# Patient Record
Sex: FEMALE | NOT HISPANIC OR LATINO | Employment: UNEMPLOYED | URBAN - METROPOLITAN AREA
[De-identification: names, ages, dates, MRNs, and addresses within clinical notes are randomized per-mention and may not be internally consistent; named-entity substitution may affect disease eponyms.]

---

## 2023-12-08 ENCOUNTER — APPOINTMENT (OUTPATIENT)
Dept: PHYSICAL THERAPY | Facility: CLINIC | Age: 43
End: 2023-12-08
Payer: COMMERCIAL

## 2023-12-13 ENCOUNTER — APPOINTMENT (OUTPATIENT)
Dept: PHYSICAL THERAPY | Facility: CLINIC | Age: 43
End: 2023-12-13
Payer: COMMERCIAL

## 2023-12-22 ENCOUNTER — EVALUATION (OUTPATIENT)
Dept: PHYSICAL THERAPY | Facility: CLINIC | Age: 43
End: 2023-12-22
Payer: COMMERCIAL

## 2023-12-22 DIAGNOSIS — M26.623 BILATERAL TEMPOROMANDIBULAR JOINT PAIN: Primary | ICD-10-CM

## 2023-12-22 PROCEDURE — 97535 SELF CARE MNGMENT TRAINING: CPT | Mod: GP

## 2023-12-22 PROCEDURE — 97162 PT EVAL MOD COMPLEX 30 MIN: CPT | Mod: GP

## 2023-12-22 ASSESSMENT — PAIN - FUNCTIONAL ASSESSMENT: PAIN_FUNCTIONAL_ASSESSMENT: 0-10

## 2023-12-22 ASSESSMENT — PAIN SCALES - GENERAL: PAINLEVEL_OUTOF10: 8

## 2023-12-22 NOTE — PROGRESS NOTES
Physical Therapy    Physical Therapy Evaluation and Treatment      Patient Name: Sabrina Quiñonez  MRN: [id number]  Today's Date: 12/22/2023  Time Calculation  Start Time: 1202  Stop Time: 1301  Time Calculation (min): 59 min    Assessment:    Pt presenting to the clinic with TMJ pain and jaw locking with complicated medical history which includes autoimmune disorders, hysterectomy and hormone RPT. Pt has had increasing pain over the past few years with intermittent episodes of jaw locking requiring ER visits. She is unable to take oral pain medicine due to gastroparesis. The pain in her jaw is impacting her ability to talk, eat, socialize with friends. She could benefit from continued PT to address these deficits. However, given patient's background and history discussed with patient maintenance vs. restoration of symptoms.    Plan:  OP PT Plan  PT Plan: Skilled PT  PT Frequency: 1 time per week  Duration: 12 weeks  Rehab Potential: Fair  Plan of Care Agreement: Patient    Planned interventions include: biofeedback, cryotherapy, education/instruction, electrical stimulation, gait training, home program, hot pack, kinesiotaping, manual therapy, neuromuscular re-education, self care/home management, therapeutic activities, and therapeutic exercises.     Current Problem:   1. Bilateral temporomandibular joint pain            Subjective    General:  General  Reason for Referral: TMD  Referred By: Pool  Pt with 3 year history of lock jaw. [facility] - he prescribed tizanidine which she couldn't take due to nausea. She is unable to take due to gastroparesis. She is looking for a primary care doctor to manage symptoms. Pain is behind her eyes, into her cheekbone, and head. She has tooth and ear pain. She has history of dizziness. She is describing far away hearing. History of low blood pressure. Hx of slurred speech or double vision - endocrinologist, neurologist, urologist aware.  Pain is  prevalent in B TMJ but more persistent on the left. Has TMJ clicking, pain with chewing, talking, yawning. Unsure but doesn't think she grind. Does not use heat or ice.   MRIs completed, saw ENT.  She has had 2 episodes of emergency room lock jaw.   Seeing a counselor, using soft foods primarily. Limited by gastroparesis.   Uses a hospital bed, wheel chair. Will intermittently use a rollator.     Impairments:   Looking at a screen for 15 minutes   Talking - cannot connect to her friends     Precautions:  Precautions  Precautions Comment: none     Pain:  Pain Assessment  Pain Assessment: 0-10  Pain Score: 8       Objective   Pt easily distractible and with increased focus on tangential topics minimally associated with conversation. Pt with poor eye contact and volume indicating diminutive tone. Pt understandably emotional during treatment session due to medical management and previous physical health. Not all measurements were taken due to extensive interview.     AROM:  Limited cervical mobility, fear avoidance. Limited in all planes, worse in sagittal plane  Jaw depression: limited to 50% available motion, nervous about extreme rom.   Pt with R mandibular deflection.   Decreased control with elevation with visible popping    PROM/Joint Mobility:  Did not assess intraorally - will attempt at next visit     Palpation:  Tenderness along TM joints L > R  L > R masseter muscle belly   Pain over L mandible proximally     Observation:   Significant tongue scallopping, more anteriorly than posteriorly   Ambulate with wheeled walker     Outcome Measures:  Other Measures  Other Outcome Measures: TMD disability index 22 (pt only filled out 1/2 of outcome measure)       EDUCATION:   Discussed neuromodulation and downtraining AT LENGTH. Pt to focus on dim lighting, breathing, and restorative positions over the next week. Discussed tongue on roof of mouth, diet changes    Goals:    Pt will be independent in HEP to maximize  POC  Pt will be compliant with neural downtraining techniques  Pt will be able to open mouth to 35 mm without locking to allow for pain free eating   Pt will be able to yawn without evidence of locking

## 2023-12-27 ENCOUNTER — APPOINTMENT (OUTPATIENT)
Dept: PHYSICAL THERAPY | Facility: CLINIC | Age: 43
End: 2023-12-27
Payer: COMMERCIAL

## 2024-01-05 ENCOUNTER — APPOINTMENT (OUTPATIENT)
Dept: PHYSICAL THERAPY | Facility: CLINIC | Age: 44
End: 2024-01-05
Payer: COMMERCIAL

## 2024-01-05 ENCOUNTER — TREATMENT (OUTPATIENT)
Dept: PHYSICAL THERAPY | Facility: CLINIC | Age: 44
End: 2024-01-05
Payer: COMMERCIAL

## 2024-01-05 DIAGNOSIS — M26.623 BILATERAL TEMPOROMANDIBULAR JOINT PAIN: ICD-10-CM

## 2024-01-05 PROCEDURE — 97535 SELF CARE MNGMENT TRAINING: CPT | Mod: GP

## 2024-01-05 NOTE — PROGRESS NOTES
PHYSICAL THERAPY   TREATMENT NOTE    Patient Name:  Sabrina Quiñonez   Patient MRN: [id number]  Date: 01/05/24    Time Calculation  Start Time: 1202  Stop Time: 1300  Time Calculation (min): 58 min    Insurance:  Insurance Type: [employer]  Visit number: 2  Approved # of visits ?  Authorization Needed: no  Cert Date Ends On: n/a    General   Reason for visit: TMD  Referred by: Dr. Lanza    Therapy diagnoses:   1. Bilateral temporomandibular joint pain  Referral to Physical Therapy           Assessment:    Continue to recommend neurological downtraining. Pt very verbose this treatment date and requires consistent redirection to task.      Plan:  Discussed rhythmic flow yoga/seated chair yoga  Discussed ice rolling, facial massage   Will do guided imagery, add in tongue on roof of mouth opening     Subjective  L ear pain has gotten worse in the past week. She has been working on breathing. Appointment with primary care this upcoming week. She went to the dentist yesterday.  Pain (0-10): 7/10    Precautions  PMH: multiple autoimmune   Fall Risk: yes    Objective    Treatment Performed:   Therapeutic Exercise:    minutes    Self Care: 58 minutes  Majority of time spent with patient discussing POC, neural re training including guided imagery, repetitive oga flow, MFR, jaw mechanics, self care techniques including diet     Manual Therapy:   minutes    Neuromuscular Re-education:   minutes    Gait Training:    minutes    Modalities: minutes

## 2024-01-12 ENCOUNTER — TELEMEDICINE CLINICAL SUPPORT (OUTPATIENT)
Dept: PHYSICAL THERAPY | Facility: CLINIC | Age: 44
End: 2024-01-12
Payer: COMMERCIAL

## 2024-01-12 DIAGNOSIS — M26.623 BILATERAL TEMPOROMANDIBULAR JOINT PAIN: Primary | ICD-10-CM

## 2024-01-12 PROCEDURE — 97110 THERAPEUTIC EXERCISES: CPT | Mod: GP

## 2024-01-12 NOTE — PROGRESS NOTES
PHYSICAL THERAPY   TREATMENT NOTE    Patient Name:  Sabrina Quiñonez   Patient MRN: [id number]  Date: 01/12/24    Time Calculation  Start Time: 1105  Stop Time: 1149  Time Calculation (min): 44 min    Insurance:  Insurance Type: [employer]  Visit number: 3  Approved # of visits ?  Authorization Needed: no  Cert Date Ends On: n/a    General   Reason for visit: TMD  Referred by: Dr. Lanza    Therapy diagnoses:   1. Bilateral temporomandibular joint pain               Assessment:    Significant improvement in mood this date  Reduction in pain during session     Plan:  Discussed rhythmic flow yoga/seated chair yoga, ice rolling, facial massage   Will do guided imagery, add in tongue on roof of mouth opening   Will continue virtual visits   Recommend focus on breathing     Subjective  *This is a virtual follow up given patient's difficulty with transportation and extreme effort to leave the house. PT is located @ [place] office and patient located at her residence.*   Her left ear is a little bit better than last time   Pain (0-10): 6-7/10 L, R 5/10, jaw pain 7/10 B, ocular pain   Getting a crown 2/8     Precautions  PMH: multiple autoimmune   Fall Risk: yes    Objective  Overjet noted   Bottom mandible shifted to L   Demonstrating continued deviation and incongruent movement with elevation     Treatment Performed:   Therapeutic Exercise:  44 minutes  Review tongue on roof mouth   Cervical side bending   SCM stretch   Seated thoracic extension hands behind head  Self scalp release  Resisted chin abduction   *relaxation music throughout tx*    Self Care:   minutes      Manual Therapy:   minutes    Neuromuscular Re-education:   minutes    Gait Training:    minutes    Modalities: minutes

## 2024-01-19 ENCOUNTER — TELEMEDICINE CLINICAL SUPPORT (OUTPATIENT)
Dept: PHYSICAL THERAPY | Facility: CLINIC | Age: 44
End: 2024-01-19
Payer: COMMERCIAL

## 2024-01-19 DIAGNOSIS — M26.623 BILATERAL TEMPOROMANDIBULAR JOINT PAIN: Primary | ICD-10-CM

## 2024-01-19 PROCEDURE — 97535 SELF CARE MNGMENT TRAINING: CPT | Mod: GP,95

## 2024-01-19 NOTE — PROGRESS NOTES
PHYSICAL THERAPY   TREATMENT NOTE    Patient Name:  Sabrina Quiñonez   Patient MRN: [id number]  Date: 01/19/24    Time Calculation  Start Time: 1245  Stop Time: 1337  Time Calculation (min): 52 min    Insurance:  Insurance Type: [employer]  Visit number: 4  Approved # of visits ?  Authorization Needed: no  Cert Date Ends On: n/a    General   Reason for visit: TMD  Referred by: Dr. Lanza    Therapy diagnoses:   1. Bilateral temporomandibular joint pain  Follow Up In Physical Therapy    Follow Up In Physical Therapy             Assessment:    Pt is exhibiting moderate improvements in symptoms, although pain present in other locations,. She is compliant with all HEP. However, she is limited by lack of mental health care at this time. Long time spent with patient discussing strategies for mental health and redirection of pain.     Plan:  Discussed better help and or bridge with therapy   Discussed stomach/prone lying with neck pillow/towel prop  Discussed ergonomic retraining for postural feedback   Redirecting pain in the morning   Continue grinding avoidance     Will continue virtual visits   Recommend focus on breathing     Subjective  Virtual or Telephone Consent    An interactive audio and video telecommunication system which permits real time communications between the patient (at the originating site) and provider (at the distant site) was utilized to provide this telehealth service.   Verbal consent was requested and obtained from Sabrina Quiñonez on this date, 01/19/24 for a telehealth visit.    Feels like there is no longevity of relief. She has had her mental therapy blocked due to insurance. Their office said it could be 30-90 before initiating it. Eating 1 small meal per day with less pain but still present. Does not have anything scheduled with Dr. Lanza yet (waiting until 2/9). Her main complaint is that she is having increased gastrointestinal pain because she is volitionally trying to reduce  grinding. Overall, TMD symptoms are lesser.   Pain (0-10): 6-7/10 L, R 5/10, jaw pain 7/10 B, ocular pain   Getting a crown 2/8     Precautions  PMH: multiple autoimmune   Fall Risk: yes    Objective  Overjet noted   Bottom mandible shifted to L   Demonstrating continued deviation and incongruent movement with elevation     Treatment Performed:   Therapeutic Exercise:    minutes  Review tongue on roof mouth   Cervical side bending   SCM stretch   Seated thoracic extension hands behind head  Self scalp release  Resisted chin abduction   *relaxation music throughout tx*    Self Care: 52 minutes  Time spent discussing POC, repetitive patterns, strategies for redirection of pain     Manual Therapy:   minutes    Neuromuscular Re-education:   minutes    Gait Training:    minutes    Modalities: minutes

## 2024-01-26 ENCOUNTER — TELEMEDICINE CLINICAL SUPPORT (OUTPATIENT)
Dept: PHYSICAL THERAPY | Facility: CLINIC | Age: 44
End: 2024-01-26
Payer: COMMERCIAL

## 2024-01-26 DIAGNOSIS — M26.623 BILATERAL TEMPOROMANDIBULAR JOINT PAIN: Primary | ICD-10-CM

## 2024-01-26 DIAGNOSIS — M26.623 BILATERAL TEMPOROMANDIBULAR JOINT PAIN: ICD-10-CM

## 2024-01-26 PROCEDURE — 97110 THERAPEUTIC EXERCISES: CPT | Mod: GP

## 2024-01-26 NOTE — PROGRESS NOTES
PHYSICAL THERAPY   EVALUATION & TREATMENT NOTE    Patient Name:  Sabrina Quiñonez   Patient MRN: [id number]  Date: 01/26/24         Insurance:  Insurance Type: ***  Visit number: ***   Approved # of visits ***  Authorization Needed: ***  Cert Date Ends On: ***    General   Reason for visit: ***   Referred by: ***    Therapy diagnoses: No diagnosis found.     ASSESSMENT    Patient will benefit from skilled therapy to address these impairments and return to prior level of functioning.     PLAN  Goals  1. Pt will be independent in HEP in 6-8 weeks  2. Pt will report 0-4/10 *** pain at rest and with activity in 6-8 weeks.  3. Pt will demonstrate 5/5 *** strength to return to *** in 6-8 weeks  4. Pt will demonstrate full *** ROM to improve *** in 6-8 weeks.  5. Pt will report *** score *** in 6-8 weeks.    Plan of care to include: therapeutic exercise, therapeutic activity, soft tissue mobilization, joint mobilizations, neuromuscular re-education, pt education, self care activities, home program, vaso/cryotherapy, gait training, dry needling, e-stim    *** x/week for *** weeks.    Patient agrees to plan of care.    SUBJECTIVE  Reviewed medical history form with patient and medical screening assessed     Pain:   At worst, pain is  Exacerbating factors include  At best, pain is  Relieving factors include    Function:   Sleep  Lives in  Baseline function:    Work:    Social:  Exercise    Pt goals:    Language: English  Potential barriers to treatment: continue to assess    Precautions:   PMH:   Fall risk -       OBJECTIVE *=painful  Gait Ambulates with    Observation/Posture    Balance  SLS    Palpation  (+) TTP    Sensation  Intact to light touch    Range of Motion (R, L in degrees)    Flexibility (R, L)  Hamstrings  Quads    Strength (R, L MMT out of 5)    Special Tests    Outcome Measures    Today's treatment and initial evaluation included:  - Patient education regarding diagnosis, prognosis, contributing factors,  comorbidities, activity modification, symptom monitoring, importance of HEP, role of PT, postural re-education, weight loss, appropriate shoe wear, smoking cessation, work ergonomics, body mechanics, return to sport, reviewed office cancel/no show policy.  - Review of POC & given HEP handout

## 2024-01-26 NOTE — PROGRESS NOTES
Physical Therapy                 Therapy Communication Note    Patient Name: Sabrina Quiñonez  MRN: [id number]  Today's Date: 1/26/2024     Discipline: Physical Therapy    Missed Visit Reason:  unknown    Missed Time: No Show    Comment: Pt was scheduled for a virtual appointment at 1115. Therapist was logged on but pt had not logged on despite having an email link. At 1125, PSR called pt to discuss appointment but went straight to voicemail. I logged out of the appt due to non attendance.

## 2024-01-26 NOTE — PROGRESS NOTES
PHYSICAL THERAPY   TREATMENT NOTE    Patient Name:  Sabrina Quiñonez   Patient MRN: [id number]  Date: 01/26/24    Time Calculation  Start Time: 1248  Stop Time: 1333  Time Calculation (min): 45 min    Insurance:  Insurance Type: [employer]  Visit number: 5  Approved # of visits ?  Authorization Needed: no  Cert Date Ends On: n/a    General   Reason for visit: TMD  Referred by: Dr. Lanza    Therapy diagnoses:   1. Bilateral temporomandibular joint pain                 Assessment:    Pt is exhibiting moderate improvements in symptoms, although pain present in other locations,. She is compliant with all HEP. However, she is limited by lack of mental health care at this time. Long time spent with patient discussing strategies for mental health and redirection of pain.     Plan:  Continue upper quarter strengthening and postural reeducation.    Will continue virtual visits   Recommend focus on breathing     Subjective  Virtual or Telephone Consent    An interactive audio and video telecommunication system which permits real time communications between the patient (at the originating site) and provider (at the distant site) was utilized to provide this telehealth service.   Verbal consent was requested and obtained from Sabrina Quiñonez on this date, 01/26/24 for a telehealth visit.    Feels like she is not grinding but is very aware of her malalignment  Pain (0-10): 5-10/10 R/L   Getting a crown 2/8     Precautions  PMH: multiple autoimmune   Fall Risk: yes    Objective  Overjet noted   Bottom mandible shifted to L   Demonstrating continued deviation and incongruent movement with elevation     Treatment Performed:   Therapeutic Exercise:  45 minutes  Cervical side bending with overpressure 10' x 4   Cervical rotation with overpressure 10' x 4   LS stretch 30 seconds x 4   Seated thoracic extension hands behind head 2 x 10   Self scalp release seated   Resisted chin abduction sitting 2 x 10   Chin tucks sitting 2 x  10   Self trigger point release to suboccipitals  *relaxation music throughout tx*    Self Care:   minutes  Time spent discussing POC, repetitive patterns, strategies for redirection of pain     Manual Therapy:   minutes    Neuromuscular Re-education:   minutes    Gait Training:    minutes    Modalities: minutes

## 2024-02-02 ENCOUNTER — TELEMEDICINE CLINICAL SUPPORT (OUTPATIENT)
Dept: PHYSICAL THERAPY | Facility: CLINIC | Age: 44
End: 2024-02-02
Payer: COMMERCIAL

## 2024-02-02 DIAGNOSIS — M26.623 BILATERAL TEMPOROMANDIBULAR JOINT PAIN: ICD-10-CM

## 2024-02-02 PROCEDURE — 97110 THERAPEUTIC EXERCISES: CPT | Mod: GP

## 2024-02-02 NOTE — PROGRESS NOTES
PHYSICAL THERAPY   TREATMENT NOTE    Patient Name:  Sabrina Quiñonez   Patient MRN: [id number]  Date: 02/02/24    Time Calculation  Start Time: 1116  Stop Time: 1155  Time Calculation (min): 39 min    Insurance:  Insurance Type: [employer]  Visit number: 6  Approved # of visits ?  Authorization Needed: no  Cert Date Ends On: n/a    General   Reason for visit: TMD  Referred by: Dr. Lanza    Therapy diagnoses:   1. Bilateral temporomandibular joint pain  Follow Up In Physical Therapy        Assessment:    Continues to benefit from continued PT to modulate symptom. Discussed with patient need for maintenance of symptoms but discuss orthodontic alignment with Dr. Lanza.    Plan:  Continue upper quarter strengthening and postural reeducation.  Next appointment is in person and will assess intraoral mobility   Recommend focus on breathing and behavior modification to reduce microtension on B TMJ    Subjective  Virtual or Telephone Consent    An interactive audio and video telecommunication system which permits real time communications between the patient (at the originating site) and provider (at the distant site) was utilized to provide this telehealth service.   Verbal consent was requested and obtained from Sabrina Quiñonez on this date, 02/02/24 for a telehealth visit.    Feels like the stuff she does at home helps but she has to do it constantly. She does feel like it all comes down to malalignment of teeth. She has been very diligent about recognizing grinding. She is noticing high pitched ringing in L ear.   Pain (0-10): 6/10 worst pain in L ear   Getting a crown 2/8     Precautions  PMH: multiple autoimmune   Fall Risk: yes    Objective  Overjet noted   Bottom mandible shifted to L   Demonstrating continued deviation and incongruent movement with elevation     Treatment Performed:   Therapeutic Exercise:  39 minutes  Seated thoracic extension hands behind head with trunk rotation x 2 minutes   Seated SB with  overpressure 10 seconds x 4 x 2  Seated UE protraction with cervical flexion 2 x 10  Seated chin tucks  2 x 10   Self upper cervical distraction sitting x 2 minutes x 2   Neck rolls in sitting x 2 minutes x 2 B directions     *relaxation music throughout tx*    Self Care:   minutes  Time spent discussing POC, repetitive patterns, strategies for redirection of pain     Manual Therapy:   minutes    Neuromuscular Re-education:   minutes    Gait Training:    minutes    Modalities: minutes

## 2024-02-09 ENCOUNTER — TREATMENT (OUTPATIENT)
Dept: PHYSICAL THERAPY | Facility: CLINIC | Age: 44
End: 2024-02-09
Payer: COMMERCIAL

## 2024-02-09 DIAGNOSIS — M26.623 BILATERAL TEMPOROMANDIBULAR JOINT PAIN: ICD-10-CM

## 2024-02-09 PROCEDURE — 97535 SELF CARE MNGMENT TRAINING: CPT | Mod: GP

## 2024-02-09 PROCEDURE — 97140 MANUAL THERAPY 1/> REGIONS: CPT | Mod: GP

## 2024-02-09 NOTE — PROGRESS NOTES
PHYSICAL THERAPY   TREATMENT NOTE    Patient Name:  Sabrina Quiñonez   Patient MRN: [id number]  Date: 02/09/24    Time Calculation  Start Time: 1101  Stop Time: 1209  Time Calculation (min): 68 min    Insurance:  Insurance Type: [employer]  Visit number: 6  Approved # of visits ?  Authorization Needed: no  Cert Date Ends On: n/a    General   Reason for visit: TMD  Referred by: Dr. Lanza    Therapy diagnoses:   1. Bilateral temporomandibular joint pain  Follow Up In Physical Therapy        Assessment:    Continues to benefit from continued PT to modulate symptom. Discussed with patient need for maintenance of symptoms but discuss orthodontic alignment with Dr. Lanza. Limited by multiple positional strategies and preferences with increased abnormal TMJ strain.     Plan:  Continue upper quarter strengthening and postural reeducation.  Will likely switch back to virtual for all appts   Recommend focus on breathing and behavior modification to reduce microtension on B TMJ. Discussed positional changes to help address microloading of TMJ     Subjective  Pt reports getting dental work yesterday. They have to break it up into 3 sessions.  She felt like they did a good job with anesthesia.   Pain (0-10): she reports improvement in symptoms today due to anesthesia 4/10 L ear pain/ringing was improved with anesthesias. Making an appointment with Dr. Lanza after today's appt.     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, PFD, hysterectomy with ovary sparing, HRT c  Fall Risk: yes    Objective  L canine tooth deformed/cracked - impending crown 3/15   Cervical spine:   Cervical L rotation at rest   Increased right mid cervical rotation   Increased L lower cervical rotation   Significant tenderness over upper cervical L/R   Significant tenderness over L mid to upper cervical TPs    Lumbar spine:  Decreased AROM R lower lumbar   Treatment Performed:   Therapeutic Exercise:    minutes  Seated thoracic  extension hands behind head with trunk rotation x 2 minutes   Seated SB with overpressure 10 seconds x 4 x 2  Seated UE protraction with cervical flexion 2 x 10  Seated chin tucks  2 x 10   Self upper cervical distraction sitting x 2 minutes x 2   Neck rolls in sitting x 2 minutes x 2 B directions     *relaxation music throughout tx*    Self Care: 45 minutes  Time spent discussing POC, repetitive patterns, strategies for redirection of pain     Manual Therapy: 24 minutes  External and internal TMD assessment explained. Verbal consent obtained to proceed with intraoral exam.     Neuromuscular Re-education:   minutes    Gait Training:    minutes    Modalities: minutes

## 2024-02-14 ENCOUNTER — TELEMEDICINE CLINICAL SUPPORT (OUTPATIENT)
Dept: PHYSICAL THERAPY | Facility: CLINIC | Age: 44
End: 2024-02-14
Payer: COMMERCIAL

## 2024-02-14 DIAGNOSIS — M26.623 BILATERAL TEMPOROMANDIBULAR JOINT PAIN: ICD-10-CM

## 2024-02-14 PROCEDURE — 97110 THERAPEUTIC EXERCISES: CPT | Mod: GP

## 2024-02-14 PROCEDURE — 97535 SELF CARE MNGMENT TRAINING: CPT | Mod: GP,95

## 2024-02-14 NOTE — PROGRESS NOTES
PHYSICAL THERAPY   TREATMENT NOTE    Patient Name:  Sabrina Quiñonez   Patient MRN: [id number]  Date: 02/15/24    Time Calculation  Start Time: 1253  Stop Time: 1332  Time Calculation (min): 39 min    Insurance:  Insurance Type: [employer]  Visit number: 8  Approved # of visits ?  Authorization Needed: no  Cert Date Ends On: n/a    General   Reason for visit: TMD  Referred by: Dr. Lanza    Therapy diagnoses:   1. Bilateral temporomandibular joint pain  Follow Up In Physical Therapy        Assessment:    Continues to benefit from continued PT to modulate symptom. Discussed with patient need for maintenance of symptoms but discuss orthodontic alignment with Dr. Lanza.     Plan:  Continue upper quarter strengthening and postural reeducation.  Recommend focus on breathing and behavior modification to reduce microtension on B TMJ. Continue neck positioning and posturing throughout the day. Discussed positional changes to help address microloading of TMJ   Try desensitization around the ear with tapping, different materials     Subjective  Virtual or Telephone Consent    An interactive audio and video telecommunication system which permits real time communications between the patient (at the originating site) and provider (at the distant site) was utilized to provide this telehealth service.   Verbal consent was requested and obtained from Sabrina Quiñonez on this date, 02/15/24 for a telehealth visit.    Pain (0-10): 9/10 L ear, head   She has an appointment with Dr. Lanza mid march. Her pain level is 9/10. Of note, she was involved in undisclosed legal procedures yesterday. Feels like pain reduction is 70-65%   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, PFD, hysterectomy with ovary sparing, HRT  Fall Risk: yes    Objective      Treatment Performed:   Therapeutic Exercise:    minutes  *relaxation music throughout tx*    Self Care: 39 minutes  Time spent discussing POC, repetitive patterns,  strategies for redirection of pain   Review HEP, pt performed flow with 5 exercises: thoracic extension, SB, rotation, tongue on roof of mouth opening, scapular retraction x 5 minutes   Discuss desensitization indications, parameters    Manual Therapy:   minutes      Neuromuscular Re-education:   minutes    Gait Training:    minutes    Modalities: minutes   narrow base of support/impaired balance/decreased strength/decreased flexibility

## 2024-02-23 ENCOUNTER — TELEMEDICINE CLINICAL SUPPORT (OUTPATIENT)
Dept: PHYSICAL THERAPY | Facility: CLINIC | Age: 44
End: 2024-02-23
Payer: COMMERCIAL

## 2024-02-23 DIAGNOSIS — M26.623 BILATERAL TEMPOROMANDIBULAR JOINT PAIN: ICD-10-CM

## 2024-02-23 PROCEDURE — 97535 SELF CARE MNGMENT TRAINING: CPT | Mod: GP

## 2024-02-23 PROCEDURE — 97110 THERAPEUTIC EXERCISES: CPT | Mod: GP

## 2024-02-23 NOTE — PROGRESS NOTES
PHYSICAL THERAPY   TREATMENT NOTE    Patient Name:  Sabrina Quiñonez   Patient MRN: [id number]  Date: 02/23/24    Time Calculation  Start Time: 1117  Stop Time: 1211  Time Calculation (min): 54 min    Insurance:  Insurance Type: [employer]  Visit number: 8  Approved # of visits ?  Authorization Needed: no  Cert Date Ends On: n/a    General   Reason for visit: TMD  Referred by: Dr. Lanza    Therapy diagnoses:   1. Bilateral temporomandibular joint pain  Follow Up In Physical Therapy        Assessment:    Pt with flare up of unknown cause similar to pre treatment levels. Pt with increased risk of disc displacement and could benefit from protective treatment versus progressive exercises.     Plan:  We Continue neck positioning and posturing throughout the day. Discussed positional changes to help address microloading of TMJ   Try desensitization around the ear with tapping, different materials     Subjective  Virtual or Telephone Consent    An interactive audio and video telecommunication system which permits real time communications between the patient (at the originating site) and provider (at the distant site) was utilized to provide this telehealth service.   Verbal consent was requested and obtained from Sabrina Quiñonez on this date, 02/23/24 for a telehealth visit.    Pain (0-10): 9/10 L ear, head   Pt reports decreased stress levels overall but a significant spike in pain in her ears, tinnitus, and anterior forehead pain that feels similar to pre therapy levels. She is unsure of what caused this . Will see Dr. Lanza 3/14. 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, PFD, hysterectomy with ovary sparing, HRT  Fall Risk: yes    Objective      Treatment Performed:   Therapeutic Exercise:  30 minutes  *relaxation music throughout tx*  Seated thoracic extension  Upper cervical self snag   Seated protraction stretch  Cross body arm  stretch   Deep breathing x 5 minutes   Scapular retraction  Tongue on roof of mouth opening   Review HEP     Self Care: 24 minutes  Time spent discussing POC, repetitive patterns, strategies for redirection of pain   Review HEP, pt performed flow with 5 exercises: thoracic extension, SB, rotation, tongue on roof of mouth opening, scapular retraction x 5 minutes   Discuss ed soft food diet, avoiding extreme ROM, chewing both sides, protective response for dealing with flare up     Manual Therapy:   minutes      Neuromuscular Re-education:   minutes    Gait Training:    minutes    Modalities: minutes

## 2024-03-01 ENCOUNTER — APPOINTMENT (OUTPATIENT)
Dept: PHYSICAL THERAPY | Facility: CLINIC | Age: 44
End: 2024-03-01
Payer: COMMERCIAL

## 2024-03-20 ENCOUNTER — TELEMEDICINE CLINICAL SUPPORT (OUTPATIENT)
Dept: PHYSICAL THERAPY | Facility: CLINIC | Age: 44
End: 2024-03-20
Payer: COMMERCIAL

## 2024-03-20 DIAGNOSIS — M26.623 BILATERAL TEMPOROMANDIBULAR JOINT PAIN: ICD-10-CM

## 2024-03-20 PROCEDURE — 97110 THERAPEUTIC EXERCISES: CPT | Mod: GP

## 2024-03-20 NOTE — PROGRESS NOTES
PHYSICAL THERAPY   TREATMENT NOTE    Patient Name:  Sabrina Quiñonez   Patient MRN: [id number]  Date: 03/20/24    Time Calculation  Start Time: 1040  Stop Time: 1125  Time Calculation (min): 45 min    Insurance:  Insurance Type: [employer]  Visit number: 9  Approved # of visits [place]  Authorization Needed: no  Cert Date Ends On: n/a    General   Reason for visit: TMD  Referred by: Dr. Lanza    Therapy diagnoses:   1. Bilateral temporomandibular joint pain  Follow Up In Physical Therapy        Assessment:    Pt has made progress and responding appropriately     Plan:  Will see in one month to revisit symptoms. Pt has good coping strategies and a thorough home program so likely will be dc'ed to HEP.    Subjective  Virtual or Telephone Consent    An interactive audio and video telecommunication system which permits real time communications between the patient (at the originating site) and provider (at the distant site) was utilized to provide this telehealth service.   Verbal consent was requested and obtained from Sabrina Quiñonez on this date, 03/20/24 for a telehealth visit.    Pain (0-10): 9/10 L ear, head   Pt reports stopping PT exercises and felt return to baseline symptoms/beginning of lock jaw within 2 days. She saw Dr. Lanza who felt like she was not a surgical candidate. She is scheduled for TMJ injections, unclear what type of injections these would be. She got a bridge on the left upper tooth 3/15 with significant increase in pain.  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, PFD, hysterectomy with ovary sparing, HRT  Fall Risk: yes    Objective      Treatment Performed:   Therapeutic Exercise:  45 minutes  *relaxation music throughout tx*  Seated thoracic extension 2 mins x 3  SB stretch 2 mins x 3   Cross body arm stretch   Deep breathing x 5 minutes x 2   Seated cervical rotation stretch 2 mins x 2   Seated thoracic rotation 2 mins x 2   Seated trunk side bending 2 mins x 2    Overhead lift and UE flexion2 mins x 2   Review HEP     Self Care:   minutes      Manual Therapy:   minutes      Neuromuscular Re-education:   minutes    Gait Training:    minutes    Modalities: minutes

## 2024-04-19 ENCOUNTER — TELEMEDICINE CLINICAL SUPPORT (OUTPATIENT)
Dept: PHYSICAL THERAPY | Facility: CLINIC | Age: 44
End: 2024-04-19
Payer: COMMERCIAL

## 2024-04-19 DIAGNOSIS — M26.623 BILATERAL TEMPOROMANDIBULAR JOINT PAIN: Primary | ICD-10-CM

## 2024-04-19 NOTE — PROGRESS NOTES
Physical Therapy                 Therapy Communication Note    Patient Name: Sabrina Quiñonez  MRN: [id number]  Today's Date: 4/19/2024     Discipline: Physical Therapy    Comment: Pt reports bilateral injections 4/4. She reports significant improvements in symptoms for 2 weeks. She is scheduled for more injections at the end of this month. Reiterated importance of exercises/stretching/strengthening during medication window. She is agreeable to discharge at this date and will follow up if needs arrive.